# Patient Record
Sex: MALE | Race: WHITE | NOT HISPANIC OR LATINO | Employment: FULL TIME
[De-identification: names, ages, dates, MRNs, and addresses within clinical notes are randomized per-mention and may not be internally consistent; named-entity substitution may affect disease eponyms.]

---

## 2023-02-12 ENCOUNTER — HEALTH MAINTENANCE LETTER (OUTPATIENT)
Age: 48
End: 2023-02-12

## 2023-07-05 ASSESSMENT — ENCOUNTER SYMPTOMS
CONSTIPATION: 0
DIZZINESS: 0
HEARTBURN: 0
SHORTNESS OF BREATH: 0
SORE THROAT: 0
PALPITATIONS: 0
PARESTHESIAS: 0
NAUSEA: 0
ARTHRALGIAS: 0
HEMATURIA: 0
DYSURIA: 0
EYE PAIN: 0
CHILLS: 0
FEVER: 0
COUGH: 0
HEMATOCHEZIA: 0
HEADACHES: 0
MYALGIAS: 0
ABDOMINAL PAIN: 0
FREQUENCY: 0
JOINT SWELLING: 0
NERVOUS/ANXIOUS: 0
DIARRHEA: 0
WEAKNESS: 0

## 2023-07-12 ENCOUNTER — LAB (OUTPATIENT)
Dept: FAMILY MEDICINE | Facility: CLINIC | Age: 48
End: 2023-07-12

## 2023-07-12 ENCOUNTER — OFFICE VISIT (OUTPATIENT)
Dept: FAMILY MEDICINE | Facility: CLINIC | Age: 48
End: 2023-07-12
Payer: COMMERCIAL

## 2023-07-12 VITALS
RESPIRATION RATE: 16 BRPM | HEIGHT: 69 IN | DIASTOLIC BLOOD PRESSURE: 80 MMHG | BODY MASS INDEX: 31.55 KG/M2 | TEMPERATURE: 97.9 F | HEART RATE: 61 BPM | SYSTOLIC BLOOD PRESSURE: 126 MMHG | OXYGEN SATURATION: 100 % | WEIGHT: 213 LBS

## 2023-07-12 DIAGNOSIS — Z00.00 ROUTINE GENERAL MEDICAL EXAMINATION AT A HEALTH CARE FACILITY: Primary | ICD-10-CM

## 2023-07-12 DIAGNOSIS — Z12.11 SCREEN FOR COLON CANCER: ICD-10-CM

## 2023-07-12 DIAGNOSIS — L72.9 SCALP CYST: ICD-10-CM

## 2023-07-12 DIAGNOSIS — E66.811 OBESITY (BMI 30.0-34.9): ICD-10-CM

## 2023-07-12 DIAGNOSIS — Z13.1 SCREENING FOR DIABETES MELLITUS: ICD-10-CM

## 2023-07-12 DIAGNOSIS — Z11.59 NEED FOR HEPATITIS C SCREENING TEST: ICD-10-CM

## 2023-07-12 DIAGNOSIS — Z11.4 SCREENING FOR HIV (HUMAN IMMUNODEFICIENCY VIRUS): ICD-10-CM

## 2023-07-12 DIAGNOSIS — Z13.220 LIPID SCREENING: ICD-10-CM

## 2023-07-12 LAB — HBA1C MFR BLD: 5.5 % (ref 0–5.6)

## 2023-07-12 PROCEDURE — 83036 HEMOGLOBIN GLYCOSYLATED A1C: CPT | Performed by: PHYSICIAN ASSISTANT

## 2023-07-12 PROCEDURE — 80061 LIPID PANEL: CPT | Performed by: PHYSICIAN ASSISTANT

## 2023-07-12 PROCEDURE — 90715 TDAP VACCINE 7 YRS/> IM: CPT | Performed by: PHYSICIAN ASSISTANT

## 2023-07-12 PROCEDURE — 36415 COLL VENOUS BLD VENIPUNCTURE: CPT | Performed by: PHYSICIAN ASSISTANT

## 2023-07-12 PROCEDURE — 99386 PREV VISIT NEW AGE 40-64: CPT | Mod: 25 | Performed by: PHYSICIAN ASSISTANT

## 2023-07-12 PROCEDURE — 90471 IMMUNIZATION ADMIN: CPT | Performed by: PHYSICIAN ASSISTANT

## 2023-07-12 ASSESSMENT — ENCOUNTER SYMPTOMS
HEMATOCHEZIA: 0
PALPITATIONS: 0
DIARRHEA: 0
DYSURIA: 0
JOINT SWELLING: 0
NAUSEA: 0
HEARTBURN: 0
FEVER: 0
HEMATURIA: 0
COUGH: 0
ABDOMINAL PAIN: 0
ARTHRALGIAS: 0
PARESTHESIAS: 0
FREQUENCY: 0
SHORTNESS OF BREATH: 0
WEAKNESS: 0
DIZZINESS: 0
HEADACHES: 0
EYE PAIN: 0
SORE THROAT: 0
CONSTIPATION: 0
MYALGIAS: 0
CHILLS: 0
NERVOUS/ANXIOUS: 0

## 2023-07-12 NOTE — PROGRESS NOTES
SUBJECTIVE:   CC: Gabriel is an 48 year old who presents for preventative health visit.       7/12/2023     3:15 PM   Additional Questions   Roomed by Jodi Trevino CMA         7/12/2023     3:15 PM   Patient Reported Additional Medications   Patient reports taking the following new medications none     Healthy Habits:     Getting at least 3 servings of Calcium per day:  Yes    Bi-annual eye exam:  Yes    Dental care twice a year:  Yes    Sleep apnea or symptoms of sleep apnea:  None    Diet:  Regular (no restrictions)    Frequency of exercise:  2-3 days/week    Duration of exercise:  15-30 minutes    Taking medications regularly:  Not Applicable    Medication side effects:  None    Additional concerns today:  Yes    5 cysts on scalp - slowly growing over past 10 years. He had scalp cysts removed in 2006. Not painful, no drainage.      Today's PHQ-2 Score:       7/12/2023     3:15 PM   PHQ-2 ( 1999 [employer])   Q1: Little interest or pleasure in doing things 0   Q2: Feeling down, depressed or hopeless 0   PHQ-2 Score 0   Q1: Little interest or pleasure in doing things Not at all   Q2: Feeling down, depressed or hopeless Not at all   PHQ-2 Score 0             Have you ever done Advance Care Planning? (For example, a Health Directive, POLST, or a discussion with a medical provider or your loved ones about your wishes): No, advance care planning information given to patient to review.  Patient plans to discuss their wishes with loved ones or provider.      Social History     Tobacco Use     Smoking status: Never     Smokeless tobacco: Never   Substance Use Topics     Alcohol use: Yes     Comment: 3 to 4 drinks per week             7/5/2023    10:38 AM   Alcohol Use   Prescreen: >3 drinks/day or >7 drinks/week? No       Last PSA: No results found for: PSA    Reviewed orders with patient. Reviewed health maintenance and updated orders accordingly - Yes  Lab work is in process  Labs reviewed in EPIC  BP Readings from Last 3  Encounters:   07/12/23 126/80    Wt Readings from Last 3 Encounters:   07/12/23 96.6 kg (213 lb)                  There is no problem list on file for this patient.    Past Surgical History:   Procedure Laterality Date     ORTHOPEDIC SURGERY  June 1986 July 1987    Hip surgery       Social History     Tobacco Use     Smoking status: Never     Smokeless tobacco: Never   Substance Use Topics     Alcohol use: Yes     Comment: 3 to 4 drinks per week     Family History   Problem Relation Age of Onset     No Known Problems Mother      Diabetes Type 2  Father      Cerebrovascular Disease Maternal Grandmother      Breast Cancer Maternal Grandmother      Diabetes Maternal Grandfather      Breast Cancer Paternal Grandmother      Coronary Artery Disease Paternal Grandfather      Diabetes Type 2  Paternal Grandfather      Diabetes Type 2  Paternal Uncle      Colon Cancer No family hx of      Prostate Cancer No family hx of          No current outpatient medications on file.     No Known Allergies  Recent Labs   Lab Test 07/12/23  1615   A1C 5.5        Reviewed and updated as needed this visit by clinical staff   Tobacco  Allergies  Meds              Reviewed and updated as needed this visit by Provider                 History reviewed. No pertinent past medical history.   Past Surgical History:   Procedure Laterality Date     ORTHOPEDIC SURGERY  June 1986 July 1987    Hip surgery       Review of Systems   Constitutional: Negative for chills and fever.   HENT: Negative for congestion, ear pain, hearing loss and sore throat.    Eyes: Negative for pain and visual disturbance.   Respiratory: Negative for cough and shortness of breath.    Cardiovascular: Negative for chest pain, palpitations and peripheral edema.   Gastrointestinal: Negative for abdominal pain, constipation, diarrhea, heartburn, hematochezia and nausea.   Genitourinary: Negative for dysuria, frequency, genital sores, hematuria, impotence, penile discharge and  "urgency.   Musculoskeletal: Negative for arthralgias, joint swelling and myalgias.   Skin: Negative for rash.   Neurological: Negative for dizziness, weakness, headaches and paresthesias.   Psychiatric/Behavioral: Negative for mood changes. The patient is not nervous/anxious.        OBJECTIVE:   /80 (BP Location: Right arm, Patient Position: Sitting, Cuff Size: Adult Large)   Pulse 61   Temp 97.9  F (36.6  C) (Tympanic)   Resp 16   Ht 1.755 m (5' 9.09\")   Wt 96.6 kg (213 lb)   SpO2 100%   BMI 31.37 kg/m      Physical Exam  GENERAL: healthy, alert and no distress  EYES: Eyes grossly normal to inspection, PERRL and conjunctivae and sclerae normal  HENT: ear canals and TM's normal, nose and mouth without ulcers or lesions  NECK: no adenopathy, no asymmetry, masses, or scars and thyroid normal to palpation  RESP: lungs clear to auscultation - no rales, rhonchi or wheezes  CV: regular rate and rhythm, normal S1 S2, no S3 or S4, no murmur, click or rub, no peripheral edema and peripheral pulses strong  ABDOMEN: soft, nontender, no hepatosplenomegaly, no masses and bowel sounds normal  MS: no gross musculoskeletal defects noted, no edema  SKIN: 5 cysts on scalp - 2 in left parietal area, one in central parietal area, two in right parietal area. They are soft, compressible, and slightly mobile. No erythema, other discoloration, or skin injury. Non-tender.  NEURO: Normal strength and tone, mentation intact and speech normal  PSYCH: mentation appears normal, affect normal/bright    Diagnostic Test Results:  Labs reviewed in Epic    ASSESSMENT/PLAN:   Routine general medical examination at a health care facility  Reviewed personal and family history. Reviewed age appropriate screenings. Reviewed healthy BP and BMI ranges. Counseled on lifestyle modifications for optimal mental and physical health.  Discussed age-appropriate health maintenance. Recommended any needed vaccinations. Continue to focus on well balanced " diet and exercise.     Screen for colon cancer  Discussed options, prefers cologuard. Discussed if this is positive, would recommend colonoscopy and that colonoscopy may be considered diagnostic instead of screening so insurance coverage may be different.   - COLOGUARD([employer]); Future    Screening for HIV (human immunodeficiency virus)  Need for hepatitis C screening test  Discussed, patient declines screening    Lipid screening  He is not fasting today  - Lipid panel reflex to direct LDL Non-fasting; Future  - Lipid panel reflex to direct LDL Non-fasting    Screening for diabetes mellitus  - Hemoglobin A1c; Future  - Hemoglobin A1c    Scalp cyst  Follow-up with dermatology  - Adult Dermatology Referral; Future    Obesity (BMI 30.0-34.9)  Discussed healthy diet/exercise      COUNSELING:   Reviewed preventive health counseling, as reflected in patient instructions        He reports that he has never smoked. He has never used smokeless tobacco.            Delores Linda PA-C  [facility]

## 2023-07-13 LAB
CHOLEST SERPL-MCNC: 151 MG/DL
HDLC SERPL-MCNC: 40 MG/DL
LDLC SERPL CALC-MCNC: 96 MG/DL
NONHDLC SERPL-MCNC: 111 MG/DL
TRIGL SERPL-MCNC: 74 MG/DL

## 2023-07-29 LAB — NONINV COLON CA DNA+OCC BLD SCRN STL QL: NEGATIVE

## 2024-02-02 ENCOUNTER — OFFICE VISIT (OUTPATIENT)
Dept: DERMATOLOGY | Facility: CLINIC | Age: 49
End: 2024-02-02
Payer: COMMERCIAL

## 2024-02-02 DIAGNOSIS — L72.11 PILAR CYST: Primary | ICD-10-CM

## 2024-02-02 PROCEDURE — 99202 OFFICE O/P NEW SF 15 MIN: CPT | Performed by: PHYSICIAN ASSISTANT

## 2024-02-02 NOTE — LETTER
2/2/2024         RE: Gabriel Gonzalez  [address]  [place] [address]        Dear Colleague,    Thank you for referring your patient, Gabriel Gonzalez, to the [facility]. Please see a copy of my visit note below.    HPI:   Chief complaints: Gabriel Gonzalez is a pleasant 48 year old male who presents for evaluation of cysts on the scalp. He has 5 cysts currently on his scalp which have been present for a while. Three of the five are growing and becoming irritated. He has had cysts removed in the past.       PHYSICAL EXAM:    There were no vitals taken for this visit.  Skin exam performed as follows: Type 2 skin. Mood appropriate  Alert and Oriented X 3. Well developed, well nourished in no distress.  General appearance: Normal  Head including face: Normal  Eyes: conjunctiva and lids: Normal  Mouth: Lips, teeth, gums: Normal  Neck: Normal  Skin: Scalp and body hair: See below    2 cm smooth mobile subcutaneous nodule on the right vertex scalp  17 cm smooth mobile subcutaneous nodule on the the left parietal scalp  18 cm smooth mobile subcutaneous nodule on the left parietal scalp  12 mm smooth mobile subcutaneous nodules on the right parietal scalp x 2    ASSESSMENT/PLAN:     Pilar cysts - the cysts on the right vertex and left parietal scalp (x2) are becoming painful and are growing. He will schedule excision with Dr Orr for this.           Follow-up: PRN  CC:   Scribed By: Delores Cardenas, MS, EILEEN      Again, thank you for allowing me to participate in the care of your patient.        Sincerely,        Delores Cardenas PA-C

## 2024-02-02 NOTE — PROGRESS NOTES
HPI:   Chief complaints: Gabriel Gonzalez is a pleasant 48 year old male who presents for evaluation of cysts on the scalp. He has 5 cysts currently on his scalp which have been present for a while. Three of the five are growing and becoming irritated. He has had cysts removed in the past.       PHYSICAL EXAM:    There were no vitals taken for this visit.  Skin exam performed as follows: Type 2 skin. Mood appropriate  Alert and Oriented X 3. Well developed, well nourished in no distress.  General appearance: Normal  Head including face: Normal  Eyes: conjunctiva and lids: Normal  Mouth: Lips, teeth, gums: Normal  Neck: Normal  Skin: Scalp and body hair: See below    2 cm smooth mobile subcutaneous nodule on the right vertex scalp  17 cm smooth mobile subcutaneous nodule on the the left parietal scalp  18 cm smooth mobile subcutaneous nodule on the left parietal scalp  12 mm smooth mobile subcutaneous nodules on the right parietal scalp x 2    ASSESSMENT/PLAN:     Pilar cysts - the cysts on the right vertex and left parietal scalp (x2) are becoming painful and are growing. He will schedule excision with Dr Orr for this.           Follow-up: PRN  CC:   Scribed By: Delores Cardenas, MS, PA-C

## 2024-08-08 ENCOUNTER — OFFICE VISIT (OUTPATIENT)
Dept: DERMATOLOGY | Facility: CLINIC | Age: 49
End: 2024-08-08
Payer: COMMERCIAL

## 2024-08-08 DIAGNOSIS — L72.11 PILAR CYST: Primary | ICD-10-CM

## 2024-08-08 PROCEDURE — 11423 EXC H-F-NK-SP B9+MARG 2.1-3: CPT | Performed by: DERMATOLOGY

## 2024-08-08 PROCEDURE — 88331 PATH CONSLTJ SURG 1 BLK 1SPC: CPT | Performed by: DERMATOLOGY

## 2024-08-08 PROCEDURE — 12031 INTMD RPR S/A/T/EXT 2.5 CM/<: CPT | Performed by: DERMATOLOGY

## 2024-08-08 PROCEDURE — 11423 EXC H-F-NK-SP B9+MARG 2.1-3: CPT | Mod: 59 | Performed by: DERMATOLOGY

## 2024-08-08 NOTE — PROGRESS NOTES
Gabriel Gonzalez is an extremely pleasant 49 year old year old male patient here today for evaluation and managment of pilar cyst..  Patient has no other skin complaints today.  Remainder of the HPI, Meds, PMH, Allergies, FH, and SH was reviewed in chart.    No past medical history on file.    Past Surgical History:   Procedure Laterality Date    ORTHOPEDIC SURGERY  June 1986 July 1987    Hip surgery, hip pinning; pins removed and doing okay        Family History   Problem Relation Age of Onset    No Known Problems Mother     Diabetes Type 2  Father     Cerebrovascular Disease Maternal Grandmother     Breast Cancer Maternal Grandmother 72    Diabetes Maternal Grandfather     Heart Disease Maternal Grandfather 67    Breast Cancer Paternal Grandmother 89    Coronary Artery Disease Paternal Grandfather     Diabetes Type 2  Paternal Grandfather     Diabetes Type 2  Paternal Uncle     Colon Cancer No family hx of     Prostate Cancer No family hx of        Social History     Socioeconomic History    Marital status:      Spouse name: Not on file    Number of children: Not on file    Years of education: Not on file    Highest education level: Not on file   Occupational History    Not on file   Tobacco Use    Smoking status: Never    Smokeless tobacco: Never   Vaping Use    Vaping status: Never Used   Substance and Sexual Activity    Alcohol use: Yes     Comment: 3 to 4 drinks per week    Drug use: Never    Sexual activity: Yes     Partners: Female   Other Topics Concern    Parent/sibling w/ CABG, MI or angioplasty before 65F 55M? No   Social History Narrative    Not on file     Social Determinants of Health     Financial Resource Strain: High Risk (1/1/2022)    Received from [facility], [facility]    Financial Resource Strain     Difficulty of Paying Living Expenses: Not on file     Difficulty of Paying Living Expenses: Not on file   Food  Insecurity: Not on file   Transportation Needs: Not on file   Physical Activity: Not on file   Stress: Not on file   Social Connections: Unknown (1/1/2022)    Received from [facility], [facility]    Social Connections     Frequency of Communication with Friends and Family: Not on file   Interpersonal Safety: Not on file   Housing Stability: Not on file       No outpatient encounter medications on file as of 8/8/2024.     No facility-administered encounter medications on file as of 8/8/2024.             O:   NAD, WDWN, Alert & Oriented, Mood & Affect wnl, Vitals stable   Here today alone   There were no vitals taken for this visit.   General appearance normal   Vitals stable   Alert, oriented and in no acute distress     L vertex scalp superior 2.3cm nodule   L vertex scalp inf 2.2cm nodule       Eyes: Conjunctivae/lids:Normal     ENT: Lips, mucosa: normal    MSK:Normal    Cardiovascular: peripheral edema none    Pulm: Breathing Normal    Neuro/Psych: Orientation:Alert and Orientedx3 ; Mood/Affect:normal       MICRO:     L vertex scalp sup (red):Unremarkable epidermis with dermal cyst lined by stratified squamous epithelium demonstrating tricholemmal keratinization with abrupt change into eosinophilic staining keratin within the lumen.    L vertex scalp inf (blue):Unremarkable epidermis with dermal cyst lined by stratified squamous epithelium demonstrating tricholemmal keratinization with abrupt change into eosinophilic staining keratin within the lumen.     A/P:  Pilar cyst  Scar discussed with patient   L vertex scalp sup   EXCISION OF CYST AND INT: After thorough discussion of PGACAC, consent obtained, anesthesia and prep, the margins of the cyst were identified and an elliptical incision was made encompassing over the cyst. The incisions were made through the skin and down to and including the subcutaneous tissue. The cyst was removed en bloc and  submitted for frozen pathologic review. hemostasis was achieved. The wound edges were then closed in a layered fashion, being careful not to leave any dead space. Postoperative length was 1 cm.   EBL minimal; complications none; wound care routine. The patient was discharged in good condition and will return in one week for wound evaluation.  L vertex scalp inf   EXCISION OF CYST AND INT: After thorough discussion of PGACAC, consent obtained, anesthesia and prep, the margins of the cyst were identified and an elliptical incision was made encompassing over the cyst. The incisions were made through the skin and down to and including the subcutaneous tissue. The cyst was removed en bloc and submitted for frozen pathologic review. hemostasis was achieved. The wound edges were then closed in a layered fashion, being careful not to leave any dead space. Postoperative length was 1 cm.   EBL minimal; complications none; wound care routine. The patient was discharged in good condition and will return in one week for wound evaluation.  It was a pleasure speaking to Gabriel Gonzalez today.

## 2024-08-08 NOTE — LETTER
8/8/2024      Gabriel Gonzalez  [address]  [place] [address]      Dear Colleague,    Thank you for referring your patient, Gabriel Gonzalez, to the [facility]. Please see a copy of my visit note below.    Gabriel Gonzalez is an extremely pleasant 49 year old year old male patient here today for evaluation and managment of pilar cyst..  Patient has no other skin complaints today.  Remainder of the HPI, Meds, PMH, Allergies, FH, and SH was reviewed in chart.    No past medical history on file.    Past Surgical History:   Procedure Laterality Date     ORTHOPEDIC SURGERY  June 1986 July 1987    Hip surgery, hip pinning; pins removed and doing okay        Family History   Problem Relation Age of Onset     No Known Problems Mother      Diabetes Type 2  Father      Cerebrovascular Disease Maternal Grandmother      Breast Cancer Maternal Grandmother 72     Diabetes Maternal Grandfather      Heart Disease Maternal Grandfather 67     Breast Cancer Paternal Grandmother 89     Coronary Artery Disease Paternal Grandfather      Diabetes Type 2  Paternal Grandfather      Diabetes Type 2  Paternal Uncle      Colon Cancer No family hx of      Prostate Cancer No family hx of        Social History     Socioeconomic History     Marital status:      Spouse name: Not on file     Number of children: Not on file     Years of education: Not on file     Highest education level: Not on file   Occupational History     Not on file   Tobacco Use     Smoking status: Never     Smokeless tobacco: Never   Vaping Use     Vaping status: Never Used   Substance and Sexual Activity     Alcohol use: Yes     Comment: 3 to 4 drinks per week     Drug use: Never     Sexual activity: Yes     Partners: Female   Other Topics Concern     Parent/sibling w/ CABG, MI or angioplasty before 65F 55M? No   Social History Narrative     Not on file     Social Determinants of Health     Financial Resource  Strain: High Risk (1/1/2022)    [facility]    Financial Resource Strain      Difficulty of Paying Living Expenses: Not on file      Difficulty of Paying Living Expenses: Not on file   Food Insecurity: Not on file   Transportation Needs: Not on file   Physical Activity: Not on file   Stress: Not on file   Social Connections: Unknown (1/1/2022)    [facility] [employer] [facility]    Social Connections      Frequency of Communication with Friends and Family: Not on file   Interpersonal Safety: Not on file   Housing Stability: Not on file       No outpatient encounter medications on file as of 8/8/2024.     No facility-administered encounter medications on file as of 8/8/2024.             O:   NAD, WDWN, Alert & Oriented, Mood & Affect wnl, Vitals stable   Here today alone   There were no vitals taken for this visit.   General appearance normal   Vitals stable   Alert, oriented and in no acute distress     L vertex scalp superior 2.3cm nodule   L vertex scalp inf 2.2cm nodule       Eyes: Conjunctivae/lids:Normal     ENT: Lips, mucosa: normal    MSK:Normal    Cardiovascular: peripheral edema none    Pulm: Breathing Normal    Neuro/Psych: Orientation:Alert and Orientedx3 ; Mood/Affect:normal       MICRO:     L vertex scalp sup (red):Unremarkable epidermis with dermal cyst lined by stratified squamous epithelium demonstrating tricholemmal keratinization with abrupt change into eosinophilic staining keratin within the lumen.    L vertex scalp inf (blue):Unremarkable epidermis with dermal cyst lined by stratified squamous epithelium demonstrating tricholemmal keratinization with abrupt change into eosinophilic staining keratin within the lumen.     A/P:  Pilar cyst  Scar discussed with patient   L vertex scalp sup   EXCISION OF CYST AND INT: After thorough  discussion of PGACAC, consent obtained, anesthesia and prep, the margins of the cyst were identified and an elliptical incision was made encompassing over the cyst. The incisions were made through the skin and down to and including the subcutaneous tissue. The cyst was removed en bloc and submitted for frozen pathologic review. hemostasis was achieved. The wound edges were then closed in a layered fashion, being careful not to leave any dead space. Postoperative length was 1 cm.   EBL minimal; complications none; wound care routine. The patient was discharged in good condition and will return in one week for wound evaluation.  L vertex scalp inf   EXCISION OF CYST AND INT: After thorough discussion of PGACAC, consent obtained, anesthesia and prep, the margins of the cyst were identified and an elliptical incision was made encompassing over the cyst. The incisions were made through the skin and down to and including the subcutaneous tissue. The cyst was removed en bloc and submitted for frozen pathologic review. hemostasis was achieved. The wound edges were then closed in a layered fashion, being careful not to leave any dead space. Postoperative length was 1 cm.   EBL minimal; complications none; wound care routine. The patient was discharged in good condition and will return in one week for wound evaluation.  It was a pleasure speaking to Gabriel Gonzalez today.      Again, thank you for allowing me to participate in the care of your patient.        Sincerely,        John Orr MD

## 2024-08-14 ENCOUNTER — ALLIED HEALTH/NURSE VISIT (OUTPATIENT)
Dept: DERMATOLOGY | Facility: CLINIC | Age: 49
End: 2024-08-14
Payer: COMMERCIAL

## 2024-08-14 DIAGNOSIS — Z48.02 ENCOUNTER FOR STAPLE REMOVAL: Primary | ICD-10-CM

## 2024-08-14 PROCEDURE — 99207 PR NO CHARGE NURSE ONLY: CPT

## 2024-08-14 NOTE — PROGRESS NOTES
Gabriel Gonzalez comes into clinic today at the request of Dr. Orr Ordering Provider for Staple Removal:  Incision was dry, clean and intact, incision cleansed with wound cleanser and staples were removed. Pt tolerated the procedure.    This service provided today was under the supervising provider of the day Delores Cardenas, who was available if needed.

## 2024-08-18 SDOH — HEALTH STABILITY: PHYSICAL HEALTH: ON AVERAGE, HOW MANY DAYS PER WEEK DO YOU ENGAGE IN MODERATE TO STRENUOUS EXERCISE (LIKE A BRISK WALK)?: 4 DAYS

## 2024-08-18 SDOH — HEALTH STABILITY: PHYSICAL HEALTH: ON AVERAGE, HOW MANY MINUTES DO YOU ENGAGE IN EXERCISE AT THIS LEVEL?: 60 MIN

## 2024-08-18 ASSESSMENT — SOCIAL DETERMINANTS OF HEALTH (SDOH): HOW OFTEN DO YOU GET TOGETHER WITH FRIENDS OR RELATIVES?: TWICE A WEEK

## 2024-08-19 ENCOUNTER — ALLIED HEALTH/NURSE VISIT (OUTPATIENT)
Dept: DERMATOLOGY | Facility: CLINIC | Age: 49
End: 2024-08-19
Payer: COMMERCIAL

## 2024-08-19 ENCOUNTER — TELEPHONE (OUTPATIENT)
Dept: DERMATOLOGY | Facility: CLINIC | Age: 49
End: 2024-08-19

## 2024-08-19 DIAGNOSIS — L72.9 SCALP CYST: Primary | ICD-10-CM

## 2024-08-19 PROCEDURE — 10060 I&D ABSCESS SIMPLE/SINGLE: CPT | Performed by: STUDENT IN AN ORGANIZED HEALTH CARE EDUCATION/TRAINING PROGRAM

## 2024-08-19 NOTE — PROGRESS NOTES
Presents w/ fluctuant swellijng 2 weeks after cyst excision. I and D discussed and performed. Contents drained cyst material rather than serous fluid or blood favoring incompletely excised pilar cyst > seroma or hematoma    After verbal and written consent, the area was prepped with alcohol,  1.3cc of lidocaine with epi (1:100,000) was injected into the lesion on the scalp .  A 15 blade was used to incise the lesion on the scalp  and the lesion was drained.  The patient tolerated the procedure well and left the Dermatology clinic in good condition.

## 2024-08-19 NOTE — TELEPHONE ENCOUNTER
Called and spoke with pt, had cyst excised in scalp. States area has filled with fluid. Scheduled nurse only wound check    Mila CABELLO RN  Dermatology   [phone]

## 2024-08-19 NOTE — TELEPHONE ENCOUNTER
[facility] Call Center    Phone Message    May a detailed message be left on voicemail: yes     Reason for Call: Other: Pt is concerned about a fluid filled sac that has formed at the excision site. Please call to advise.     Action Taken: Other: OX Derm    Travel Screening: Not Applicable

## 2024-08-23 ENCOUNTER — OFFICE VISIT (OUTPATIENT)
Dept: FAMILY MEDICINE | Facility: CLINIC | Age: 49
End: 2024-08-23
Attending: PHYSICIAN ASSISTANT
Payer: COMMERCIAL

## 2024-08-23 VITALS
OXYGEN SATURATION: 99 % | WEIGHT: 186.8 LBS | HEIGHT: 69 IN | TEMPERATURE: 97.5 F | BODY MASS INDEX: 27.67 KG/M2 | HEART RATE: 60 BPM | RESPIRATION RATE: 12 BRPM | SYSTOLIC BLOOD PRESSURE: 126 MMHG | DIASTOLIC BLOOD PRESSURE: 81 MMHG

## 2024-08-23 DIAGNOSIS — Z00.00 ROUTINE GENERAL MEDICAL EXAMINATION AT A HEALTH CARE FACILITY: Primary | ICD-10-CM

## 2024-08-23 PROCEDURE — 99396 PREV VISIT EST AGE 40-64: CPT | Performed by: PHYSICIAN ASSISTANT

## 2024-08-23 ASSESSMENT — PAIN SCALES - GENERAL: PAINLEVEL: NO PAIN (0)

## 2024-08-23 NOTE — PROGRESS NOTES
"Preventive Care Visit  [facility] MARIELA Linda PA-C, Family Medicine  Aug 23, 2024      Assessment & Plan     Routine general medical examination at a health care facility  Doing well, no concerns. Reviewed lipids and glucose from last year. Discussed prostate cancer screening and he prefers to hold off on that for now.      BMI  Estimated body mass index is 27.51 kg/m  as calculated from the following:    Height as of this encounter: 1.755 m (5' 9.09\").    Weight as of this encounter: 84.7 kg (186 lb 12.8 oz).   Weight management plan: Discussed healthy diet and exercise guidelines    Counseling  Appropriate preventive services were discussed with this patient.  Checklist reviewing preventive services available has been given to the patient.      Marga Rios is a 49 year old, presenting for the following:  Physical        8/23/2024     1:15 PM   Additional Questions   Roomed by MR   Accompanied by NA         8/23/2024     1:15 PM   Patient Reported Additional Medications   Patient reports taking the following new medications NA        Health Care Directive  Patient does not have a Health Care Directive or Living Will: Discussed advance care planning with patient; however, patient declined at this time.    HPI        8/18/2024   General Health   How would you rate your overall physical health? Good   Feel stress (tense, anxious, or unable to sleep) To some extent      (!) STRESS CONCERN      8/18/2024   Nutrition   Three or more servings of calcium each day? Yes   Diet: Regular (no restrictions)   How many servings of fruit and vegetables per day? (!) 2-3   How many sweetened beverages each day? 0-1            8/18/2024   Exercise   Days per week of moderate/strenous exercise 4 days   Average minutes spent exercising at this level 60 min            8/18/2024   Social Factors   Frequency of gathering with friends or relatives Twice a week   Worry food won't last until get money " to buy more No   Food not last or not have enough money for food? No   Do you have housing? (Housing is defined as stable permanent housing and does not include staying ouside in a car, in a tent, in an abandoned building, in an overnight shelter, or couch-surfing.) Yes   Are you worried about losing your housing? No   Lack of transportation? No   Unable to get utilities (heat,electricity)? No            8/18/2024   Fall Risk   Fallen 2 or more times in the past year? No   Trouble with walking or balance? No             8/18/2024   Dental   Dentist two times every year? Yes            8/18/2024   TB Screening   Were you born outside of the [place]? No            Today's PHQ-2 Score:       8/22/2024     1:44 PM   PHQ-2 ( 1999 [employer])   Q1: Little interest or pleasure in doing things 0   Q2: Feeling down, depressed or hopeless 0   PHQ-2 Score 0   Q1: Little interest or pleasure in doing things Not at all   Q2: Feeling down, depressed or hopeless Not at all   PHQ-2 Score 0           8/18/2024   Substance Use   Alcohol more than 3/day or more than 7/wk No   Do you use any other substances recreationally? No        Social History     Tobacco Use    Smoking status: Never    Smokeless tobacco: Never   Vaping Use    Vaping status: Never Used   Substance Use Topics    Alcohol use: Yes     Alcohol/week: 3.0 - 4.0 standard drinks of alcohol     Types: 3 - 4 Standard drinks or equivalent per week     Comment: 3 to 4 drinks per week    Drug use: Never           8/18/2024   STI Screening   New sexual partner(s) since last STI/HIV test? No      ASCVD Risk   The 10-year ASCVD risk score (Rob MERRILL, et al., 2019) is: 2.9%    Values used to calculate the score:      Age: 49 years      Sex: Male      Is Non- : No      Diabetic: No      Tobacco smoker: No      Systolic Blood Pressure: 126 mmHg      Is BP treated: No      HDL Cholesterol: 40 mg/dL      Total Cholesterol: 151 mg/dL            8/18/2024    Contraception/Family Planning   Questions about contraception or family planning No           Reviewed and updated as needed this visit by Provider                    No past medical history on file.  Past Surgical History:   Procedure Laterality Date    ORTHOPEDIC SURGERY  June 1986 July 1987    Hip surgery, hip pinning; pins removed and doing okay     Labs reviewed in EPIC  BP Readings from Last 3 Encounters:   08/23/24 126/81   07/12/23 126/80    Wt Readings from Last 3 Encounters:   08/23/24 84.7 kg (186 lb 12.8 oz)   07/12/23 96.6 kg (213 lb)                  There is no problem list on file for this patient.    Past Surgical History:   Procedure Laterality Date    ORTHOPEDIC SURGERY  June 1986 July 1987    Hip surgery, hip pinning; pins removed and doing okay       Social History     Tobacco Use    Smoking status: Never    Smokeless tobacco: Never   Substance Use Topics    Alcohol use: Yes     Alcohol/week: 3.0 - 4.0 standard drinks of alcohol     Types: 3 - 4 Standard drinks or equivalent per week     Comment: 3 to 4 drinks per week     Family History   Problem Relation Age of Onset    No Known Problems Mother     Diabetes Type 2  Father     Diabetes Father     Cerebrovascular Disease Maternal Grandmother     Breast Cancer Maternal Grandmother 72    Diabetes Maternal Grandfather     Heart Disease Maternal Grandfather 67    Breast Cancer Paternal Grandmother 89    Coronary Artery Disease Paternal Grandfather     Diabetes Type 2  Paternal Grandfather     Diabetes Type 2  Paternal Uncle     Colon Cancer No family hx of     Prostate Cancer No family hx of          No current outpatient medications on file.     No Known Allergies  Recent Labs   Lab Test 07/12/23  1615   A1C 5.5   LDL 96   HDL 40   TRIG 74          Review of Systems  Constitutional, HEENT, cardiovascular, pulmonary, gi and gu systems are negative, except as otherwise noted.     Objective    Exam  /81 (BP Location: Right arm, Patient  "Position: Sitting, Cuff Size: Adult Regular)   Pulse 60   Temp 97.5  F (36.4  C) (Oral)   Resp 12   Ht 1.755 m (5' 9.09\")   Wt 84.7 kg (186 lb 12.8 oz)   SpO2 99%   BMI 27.51 kg/m     Estimated body mass index is 27.51 kg/m  as calculated from the following:    Height as of this encounter: 1.755 m (5' 9.09\").    Weight as of this encounter: 84.7 kg (186 lb 12.8 oz).    Physical Exam  GENERAL: alert and no distress  EYES: Eyes grossly normal to inspection, PERRL and conjunctivae and sclerae normal  HENT: ear canals and TM's normal, nose and mouth without ulcers or lesions  NECK: no adenopathy, no asymmetry, masses, or scars  RESP: lungs clear to auscultation - no rales, rhonchi or wheezes  CV: regular rate and rhythm, normal S1 S2, no S3 or S4, no murmur, click or rub, no peripheral edema  ABDOMEN: soft, nontender, no hepatosplenomegaly, no masses and bowel sounds normal  MS: no gross musculoskeletal defects noted, no edema  NEURO: Normal strength and tone, mentation intact and speech normal  PSYCH: mentation appears normal, affect normal/bright        Signed Electronically by: Delores Linda PA-C    "

## 2024-08-23 NOTE — PATIENT INSTRUCTIONS
Patient Education   Preventive Care Advice   This is general advice given by our system to help you stay healthy. However, your care team may have specific advice just for you. Please talk to your care team about your preventive care needs.  Nutrition  Eat 5 or more servings of fruits and vegetables each day.  Try wheat bread, brown rice and whole grain pasta (instead of white bread, rice, and pasta).  Get enough calcium and vitamin D. Check the label on foods and aim for 100% of the RDA (recommended daily allowance).  Lifestyle  Exercise at least 150 minutes each week  (30 minutes a day, 5 days a week).  Do muscle strengthening activities 2 days a week. These help control your weight and prevent disease.  No smoking.  Wear sunscreen to prevent skin cancer.  Have a dental exam and cleaning every 6 months.  Yearly exams  See your health care team every year to talk about:  Any changes in your health.  Any medicines your care team has prescribed.  Preventive care, family planning, and ways to prevent chronic diseases.  Shots (vaccines)   HPV shots (up to age 26), if you've never had them before.  Hepatitis B shots (up to age 59), if you've never had them before.  COVID-19 shot: Get this shot when it's due.  Flu shot: Get a flu shot every year.  Tetanus shot: Get a tetanus shot every 10 years.  Pneumococcal, hepatitis A, and RSV shots: Ask your care team if you need these based on your risk.  Shingles shot (for age 50 and up)  General health tests  Diabetes screening:  Starting at age 35, Get screened for diabetes at least every 3 years.  If you are younger than age 35, ask your care team if you should be screened for diabetes.  Cholesterol test: At age 39, start having a cholesterol test every 5 years, or more often if advised.  Bone density scan (DEXA): At age 50, ask your care team if you should have this scan for osteoporosis (brittle bones).  Hepatitis C: Get tested at least once in your life.  STIs (sexually  transmitted infections)  Before age 24: Ask your care team if you should be screened for STIs.  After age 24: Get screened for STIs if you're at risk. You are at risk for STIs (including HIV) if:  You are sexually active with more than one person.  You don't use condoms every time.  You or a partner was diagnosed with a sexually transmitted infection.  If you are at risk for HIV, ask about PrEP medicine to prevent HIV.  Get tested for HIV at least once in your life, whether you are at risk for HIV or not.  Cancer screening tests  Cervical cancer screening: If you have a cervix, begin getting regular cervical cancer screening tests starting at age 21.  Breast cancer scan (mammogram): If you've ever had breasts, begin having regular mammograms starting at age 40. This is a scan to check for breast cancer.  Colon cancer screening: It is important to start screening for colon cancer at age 45.  Have a colonoscopy test every 10 years (or more often if you're at risk) Or, ask your provider about stool tests like a FIT test every year or Cologuard test every 3 years.  To learn more about your testing options, visit:   .  For help making a decision, visit:   https://bit.ly/dy89271.  Prostate cancer screening test: If you have a prostate, ask your care team if a prostate cancer screening test (PSA) at age 55 is right for you.  Lung cancer screening: If you are a current or former smoker ages 50 to 80, ask your care team if ongoing lung cancer screenings are right for you.  For informational purposes only. Not to replace the advice of your health care provider. Copyright   2023 [employer]. All rights reserved. Clinically reviewed by the [facility]itions Program. [employer] [id number] - REV 01/24.

## 2024-08-29 ENCOUNTER — OFFICE VISIT (OUTPATIENT)
Dept: DERMATOLOGY | Facility: CLINIC | Age: 49
End: 2024-08-29
Payer: COMMERCIAL

## 2024-08-29 DIAGNOSIS — L72.11 PILAR CYST: Primary | ICD-10-CM

## 2024-08-29 PROCEDURE — 11423 EXC H-F-NK-SP B9+MARG 2.1-3: CPT | Mod: 59 | Performed by: DERMATOLOGY

## 2024-08-29 PROCEDURE — 88304 TISSUE EXAM BY PATHOLOGIST: CPT | Mod: 59 | Performed by: PATHOLOGY

## 2024-08-29 PROCEDURE — 11423 EXC H-F-NK-SP B9+MARG 2.1-3: CPT | Mod: 79 | Performed by: DERMATOLOGY

## 2024-08-29 PROCEDURE — 12031 INTMD RPR S/A/T/EXT 2.5 CM/<: CPT | Mod: 79 | Performed by: DERMATOLOGY

## 2024-08-29 NOTE — PROGRESS NOTES
Gabriel Gonzalez is an extremely pleasant 49 year old year old male patient here today for evaluation and managment of pilar cyst.  Patient has no other skin complaints today.  Remainder of the HPI, Meds, PMH, Allergies, FH, and SH was reviewed in chart.    No past medical history on file.    Past Surgical History:   Procedure Laterality Date    ORTHOPEDIC SURGERY  June 1986 July 1987    Hip surgery, hip pinning; pins removed and doing okay        Family History   Problem Relation Age of Onset    No Known Problems Mother     Diabetes Type 2  Father     Diabetes Father     Cerebrovascular Disease Maternal Grandmother     Breast Cancer Maternal Grandmother 72    Diabetes Maternal Grandfather     Heart Disease Maternal Grandfather 67    Breast Cancer Paternal Grandmother 89    Coronary Artery Disease Paternal Grandfather     Diabetes Type 2  Paternal Grandfather     Diabetes Type 2  Paternal Uncle     Colon Cancer No family hx of     Prostate Cancer No family hx of        Social History     Socioeconomic History    Marital status:      Spouse name: Not on file    Number of children: Not on file    Years of education: Not on file    Highest education level: Not on file   Occupational History    Not on file   Tobacco Use    Smoking status: Never    Smokeless tobacco: Never   Vaping Use    Vaping status: Never Used   Substance and Sexual Activity    Alcohol use: Yes     Alcohol/week: 3.0 - 4.0 standard drinks of alcohol     Types: 3 - 4 Standard drinks or equivalent per week     Comment: 3 to 4 drinks per week    Drug use: Never    Sexual activity: Yes     Partners: Female   Other Topics Concern    Parent/sibling w/ CABG, MI or angioplasty before 65F 55M? No   Social History Narrative    Not on file     Social Determinants of Health     Financial Resource Strain: Low Risk  (8/18/2024)    Financial Resource Strain     Within the past 12 months, have you or your family members you live with been unable to get  utilities (heat, electricity) when it was really needed?: No   Food Insecurity: Low Risk  (8/18/2024)    Food Insecurity     Within the past 12 months, did you worry that your food would run out before you got money to buy more?: No     Within the past 12 months, did the food you bought just not last and you didn t have money to get more?: No   Transportation Needs: Low Risk  (8/18/2024)    Transportation Needs     Within the past 12 months, has lack of transportation kept you from medical appointments, getting your medicines, non-medical meetings or appointments, work, or from getting things that you need?: No   Physical Activity: Sufficiently Active (8/18/2024)    Exercise Vital Sign     Days of Exercise per Week: 4 days     Minutes of Exercise per Session: 60 min   Stress: Stress Concern Present (8/18/2024)    [employer] [place] [employer] - Occupational Stress Questionnaire     Feeling of Stress : To some extent   Social Connections: Unknown (8/18/2024)    Social Connection and Isolation Panel [NHANES]     Frequency of Communication with Friends and Family: Not on file     Frequency of Social Gatherings with Friends and Family: Twice a week     Attends Religion Services: Not on file     Active Member of Clubs or Organizations: Not on file     Attends Club or Organization Meetings: Not on file     Marital Status: Not on file   Interpersonal Safety: Low Risk  (8/23/2024)    Interpersonal Safety     Do you feel physically and emotionally safe where you currently live?: Yes     Within the past 12 months, have you been hit, slapped, kicked or otherwise physically hurt by someone?: No     Within the past 12 months, have you been humiliated or emotionally abused in other ways by your partner or ex-partner?: No   Housing Stability: Low Risk  (8/18/2024)    Housing Stability     Do you have housing? : Yes     Are you worried about losing your housing?: No       No outpatient encounter medications on file as  of 8/29/2024.     No facility-administered encounter medications on file as of 8/29/2024.             O:   NAD, WDWN, Alert & Oriented, Mood & Affect wnl, Vitals stable   General appearance normal   Vitals stable   Alert, oriented and in no acute distress     Vertex scalp 2.2cm nodule  R occ scalp 2.1cm nodule      Eyes: Conjunctivae/lids:Normal     ENT: Lips, mucosa: normal    MSK:Normal    Cardiovascular: peripheral edema none    Pulm: Breathing Normal    Neuro/Psych: Orientation:Alert and Orientedx3 ; Mood/Affect:normal       A/P:  Pilar cyst   Vertex scalp  EXCISION OF CYST AND int : After thorough discussion of PGACAC, consent obtained, anesthesia and prep, the margins of the cyst were identified and an elliptical incision was made over the cyst. The incisions were made through the skin and down to and including the subcutaneous tissue. The cyst was removed en bloc and submitted for permanent pathologic review. hemostasis was achieved. The wound edges were then closed in a layered fashion, being careful not to leave any dead space. Postoperative length was 1 cm.   EBL minimal; complications none; wound care routine. The patient was discharged in good condition and will return in one week for wound evaluation.  R occ scalp   EXCISION OF CYST AND int : After thorough discussion of PGACAC, consent obtained, anesthesia and prep, the margins of the cyst were identified and an elliptical incision was made over the cyst. The incisions were made through the skin and down to and including the subcutaneous tissue. The cyst was removed en bloc and submitted for permanent pathologic review. hemostasis was achieved. The wound edges were then closed in a layered fashion, being careful not to leave any dead space. Postoperative length was 1 cm.   EBL minimal; complications none; wound care routine. The patient was discharged in good condition and will return in one week for wound evaluation.  It was a pleasure speaking to  Gabriel Gonzalez today.

## 2024-08-29 NOTE — PATIENT INSTRUCTIONS
Stapled Wound Care     for ______scalp x2________    [facility] [phone]    No strenuous activity for 48 hours. Resume moderate activity in 48 hours. No heavy exercising until you are seen for follow up in one week.    Take Tylenol as needed for discomfort.                                        Do not drink alcoholic beverages for 48 hours.    Keep the pressure bandage in place for 24 hours. If the bandage becomes blood tinged or loose, reinforce it with gauze and tape.     Remove bandage in 24 hours and begin wound care as follows:       Rinse the stapled area with tap water. (shower / bathe / shampoo normally)  Dry wound with Q tip or gauze pad  Apply Polysporin or Bacitracin Ointment to the staples.  Do NOT use Neosporin Ointment *  Cover the wound with a bandaid or nonstick gauze pad and paper tape.  Repeat wound care once a day until staples are removed.    BLEEDING:    Use tightly rolled up gauze or cloth to apply direct pressure over the bandage for 20   minutes.  Reapply pressure for an additional 20 minutes if necessary  Call the office or go to the nearest emergency room if pressure fails to stop the bleeding.  Use additional gauze and tape to maintain pressure once the bleeding has stopped.  Begin wound care 24 hours after surgery as directed.            PAIN:    1. Post operative pain should slowly get better, beginning the evening after surgery.  2.  A sudden or severe increase in pain may indicate a problem. Call the office if this occurs.      In case of emergency call Dr. Orr  at [phone]

## 2024-08-29 NOTE — LETTER
8/29/2024      Gabriel Gonzalez  [address]  [facility] [address]      Dear Colleague,    Thank you for referring your patient, Gabriel Gonzalez, to the [facility]. Please see a copy of my visit note below.    Gabriel Gonzalez is an extremely pleasant 49 year old year old male patient here today for evaluation and managment of pilar cyst.  Patient has no other skin complaints today.  Remainder of the HPI, Meds, PMH, Allergies, FH, and SH was reviewed in chart.    No past medical history on file.    Past Surgical History:   Procedure Laterality Date     ORTHOPEDIC SURGERY  June 1986 July 1987    Hip surgery, hip pinning; pins removed and doing okay        Family History   Problem Relation Age of Onset     No Known Problems Mother      Diabetes Type 2  Father      Diabetes Father      Cerebrovascular Disease Maternal Grandmother      Breast Cancer Maternal Grandmother 72     Diabetes Maternal Grandfather      Heart Disease Maternal Grandfather 67     Breast Cancer Paternal Grandmother 89     Coronary Artery Disease Paternal Grandfather      Diabetes Type 2  Paternal Grandfather      Diabetes Type 2  Paternal Uncle      Colon Cancer No family hx of      Prostate Cancer No family hx of        Social History     Socioeconomic History     Marital status:      Spouse name: Not on file     Number of children: Not on file     Years of education: Not on file     Highest education level: Not on file   Occupational History     Not on file   Tobacco Use     Smoking status: Never     Smokeless tobacco: Never   Vaping Use     Vaping status: Never Used   Substance and Sexual Activity     Alcohol use: Yes     Alcohol/week: 3.0 - 4.0 standard drinks of alcohol     Types: 3 - 4 Standard drinks or equivalent per week     Comment: 3 to 4 drinks per week     Drug use: Never     Sexual activity: Yes     Partners: Female   Other Topics Concern     Parent/sibling w/ CABG, MI or  angioplasty before 65F 55M? No   Social History Narrative     Not on file     Social Determinants of Health     Financial Resource Strain: Low Risk  (8/18/2024)    Financial Resource Strain      Within the past 12 months, have you or your family members you live with been unable to get utilities (heat, electricity) when it was really needed?: No   Food Insecurity: Low Risk  (8/18/2024)    Food Insecurity      Within the past 12 months, did you worry that your food would run out before you got money to buy more?: No      Within the past 12 months, did the food you bought just not last and you didn t have money to get more?: No   Transportation Needs: Low Risk  (8/18/2024)    Transportation Needs      Within the past 12 months, has lack of transportation kept you from medical appointments, getting your medicines, non-medical meetings or appointments, work, or from getting things that you need?: No   Physical Activity: Sufficiently Active (8/18/2024)    Exercise Vital Sign      Days of Exercise per Week: 4 days      Minutes of Exercise per Session: 60 min   Stress: Stress Concern Present (8/18/2024)    [employer] [place] [employer] - Occupational Stress Questionnaire      Feeling of Stress : To some extent   Social Connections: Unknown (8/18/2024)    Social Connection and Isolation Panel [NHANES]      Frequency of Communication with Friends and Family: Not on file      Frequency of Social Gatherings with Friends and Family: Twice a week      Attends Anabaptism Services: Not on file      Active Member of Clubs or Organizations: Not on file      Attends Club or Organization Meetings: Not on file      Marital Status: Not on file   Interpersonal Safety: Low Risk  (8/23/2024)    Interpersonal Safety      Do you feel physically and emotionally safe where you currently live?: Yes      Within the past 12 months, have you been hit, slapped, kicked or otherwise physically hurt by someone?: No      Within the past 12  months, have you been humiliated or emotionally abused in other ways by your partner or ex-partner?: No   Housing Stability: Low Risk  (8/18/2024)    Housing Stability      Do you have housing? : Yes      Are you worried about losing your housing?: No       No outpatient encounter medications on file as of 8/29/2024.     No facility-administered encounter medications on file as of 8/29/2024.             O:   NAD, WDWN, Alert & Oriented, Mood & Affect wnl, Vitals stable   General appearance normal   Vitals stable   Alert, oriented and in no acute distress     Vertex scalp 2.2cm nodule  R occ scalp 2.1cm nodule      Eyes: Conjunctivae/lids:Normal     ENT: Lips, mucosa: normal    MSK:Normal    Cardiovascular: peripheral edema none    Pulm: Breathing Normal    Neuro/Psych: Orientation:Alert and Orientedx3 ; Mood/Affect:normal       A/P:  Pilar cyst   Vertex scalp  EXCISION OF CYST AND int : After thorough discussion of PGACAC, consent obtained, anesthesia and prep, the margins of the cyst were identified and an elliptical incision was made over the cyst. The incisions were made through the skin and down to and including the subcutaneous tissue. The cyst was removed en bloc and submitted for permanent pathologic review. hemostasis was achieved. The wound edges were then closed in a layered fashion, being careful not to leave any dead space. Postoperative length was 1 cm.   EBL minimal; complications none; wound care routine. The patient was discharged in good condition and will return in one week for wound evaluation.  R occ scalp   EXCISION OF CYST AND int : After thorough discussion of PGACAC, consent obtained, anesthesia and prep, the margins of the cyst were identified and an elliptical incision was made over the cyst. The incisions were made through the skin and down to and including the subcutaneous tissue. The cyst was removed en bloc and submitted for permanent pathologic review. hemostasis was achieved. The  wound edges were then closed in a layered fashion, being careful not to leave any dead space. Postoperative length was 1 cm.   EBL minimal; complications none; wound care routine. The patient was discharged in good condition and will return in one week for wound evaluation.  It was a pleasure speaking to Gabriel Gonzalez today.      Again, thank you for allowing me to participate in the care of your patient.        Sincerely,        John Orr MD

## 2024-09-03 LAB
PATH REPORT.COMMENTS IMP SPEC: NORMAL
PATH REPORT.COMMENTS IMP SPEC: NORMAL
PATH REPORT.FINAL DX SPEC: NORMAL
PATH REPORT.GROSS SPEC: NORMAL
PATH REPORT.MICROSCOPIC SPEC OTHER STN: NORMAL
PATH REPORT.RELEVANT HX SPEC: NORMAL

## 2024-09-06 ENCOUNTER — ALLIED HEALTH/NURSE VISIT (OUTPATIENT)
Dept: DERMATOLOGY | Facility: CLINIC | Age: 49
End: 2024-09-06
Payer: COMMERCIAL

## 2024-09-06 DIAGNOSIS — Z48.01 DRESSING CHANGE OR REMOVAL, SURGICAL WOUND: Primary | ICD-10-CM

## 2024-09-06 PROCEDURE — 99207 PR NO CHARGE NURSE ONLY: CPT

## 2024-09-06 NOTE — PROGRESS NOTES
Gabriel Houser Lisa comes into clinic today at the request of Dr. Orr Ordering Provider for Staple Removal:  Incision was dry, clean and intact, incision cleansed with wound cleanser and staples were removed. Pt tolerated the procedure.     This service provided today was under the supervising provider of the yeny Cardenas PA-C, who was available if needed.    Mila Mckeon RN

## 2025-08-22 SDOH — HEALTH STABILITY: PHYSICAL HEALTH: ON AVERAGE, HOW MANY MINUTES DO YOU ENGAGE IN EXERCISE AT THIS LEVEL?: 70 MIN

## 2025-08-22 SDOH — HEALTH STABILITY: PHYSICAL HEALTH: ON AVERAGE, HOW MANY DAYS PER WEEK DO YOU ENGAGE IN MODERATE TO STRENUOUS EXERCISE (LIKE A BRISK WALK)?: 4 DAYS

## 2025-08-25 ENCOUNTER — OFFICE VISIT (OUTPATIENT)
Dept: FAMILY MEDICINE | Facility: CLINIC | Age: 50
End: 2025-08-25
Attending: PHYSICIAN ASSISTANT
Payer: COMMERCIAL

## 2025-08-25 VITALS
OXYGEN SATURATION: 99 % | RESPIRATION RATE: 16 BRPM | DIASTOLIC BLOOD PRESSURE: 79 MMHG | BODY MASS INDEX: 27.3 KG/M2 | SYSTOLIC BLOOD PRESSURE: 134 MMHG | HEART RATE: 54 BPM | WEIGHT: 190.7 LBS | HEIGHT: 70 IN

## 2025-08-25 DIAGNOSIS — Z00.00 ROUTINE GENERAL MEDICAL EXAMINATION AT A HEALTH CARE FACILITY: Primary | ICD-10-CM

## 2025-08-25 DIAGNOSIS — Z13.220 LIPID SCREENING: ICD-10-CM

## 2025-08-25 DIAGNOSIS — Z13.1 SCREENING FOR DIABETES MELLITUS: ICD-10-CM

## 2025-08-25 DIAGNOSIS — Z12.5 SCREENING FOR PROSTATE CANCER: ICD-10-CM

## 2025-08-25 PROCEDURE — 1126F AMNT PAIN NOTED NONE PRSNT: CPT | Performed by: PHYSICIAN ASSISTANT

## 2025-08-25 PROCEDURE — 99396 PREV VISIT EST AGE 40-64: CPT | Mod: 25 | Performed by: PHYSICIAN ASSISTANT

## 2025-08-25 PROCEDURE — 36415 COLL VENOUS BLD VENIPUNCTURE: CPT | Performed by: PHYSICIAN ASSISTANT

## 2025-08-25 PROCEDURE — 3075F SYST BP GE 130 - 139MM HG: CPT | Performed by: PHYSICIAN ASSISTANT

## 2025-08-25 PROCEDURE — 82947 ASSAY GLUCOSE BLOOD QUANT: CPT | Performed by: PHYSICIAN ASSISTANT

## 2025-08-25 PROCEDURE — 90472 IMMUNIZATION ADMIN EACH ADD: CPT | Performed by: PHYSICIAN ASSISTANT

## 2025-08-25 PROCEDURE — 80061 LIPID PANEL: CPT | Performed by: PHYSICIAN ASSISTANT

## 2025-08-25 PROCEDURE — 3078F DIAST BP <80 MM HG: CPT | Performed by: PHYSICIAN ASSISTANT

## 2025-08-25 PROCEDURE — 90677 PCV20 VACCINE IM: CPT | Performed by: PHYSICIAN ASSISTANT

## 2025-08-25 PROCEDURE — G0103 PSA SCREENING: HCPCS | Performed by: PHYSICIAN ASSISTANT

## 2025-08-25 PROCEDURE — 90750 HZV VACC RECOMBINANT IM: CPT | Performed by: PHYSICIAN ASSISTANT

## 2025-08-25 PROCEDURE — 90471 IMMUNIZATION ADMIN: CPT | Performed by: PHYSICIAN ASSISTANT

## 2025-08-25 ASSESSMENT — PAIN SCALES - GENERAL: PAINLEVEL_OUTOF10: NO PAIN (0)

## 2025-08-26 LAB
CHOLEST SERPL-MCNC: 150 MG/DL
FASTING STATUS PATIENT QL REPORTED: YES
FASTING STATUS PATIENT QL REPORTED: YES
GLUCOSE SERPL-MCNC: 85 MG/DL (ref 70–99)
HDLC SERPL-MCNC: 44 MG/DL
LDLC SERPL CALC-MCNC: 95 MG/DL
NONHDLC SERPL-MCNC: 106 MG/DL
PSA SERPL DL<=0.01 NG/ML-MCNC: 0.53 NG/ML (ref 0–3.5)
TRIGL SERPL-MCNC: 57 MG/DL